# Patient Record
Sex: FEMALE | Race: BLACK OR AFRICAN AMERICAN | Employment: FULL TIME | URBAN - METROPOLITAN AREA
[De-identification: names, ages, dates, MRNs, and addresses within clinical notes are randomized per-mention and may not be internally consistent; named-entity substitution may affect disease eponyms.]

---

## 2018-08-10 ENCOUNTER — HOSPITAL ENCOUNTER (EMERGENCY)
Age: 23
Discharge: HOME | End: 2018-08-10
Attending: EMERGENCY MEDICINE
Payer: COMMERCIAL

## 2018-08-10 ENCOUNTER — APPOINTMENT (OUTPATIENT)
Dept: GENERAL RADIOLOGY | Age: 23
End: 2018-08-10
Payer: COMMERCIAL

## 2018-08-10 VITALS
OXYGEN SATURATION: 100 % | HEIGHT: 62 IN | HEART RATE: 92 BPM | DIASTOLIC BLOOD PRESSURE: 71 MMHG | WEIGHT: 120 LBS | RESPIRATION RATE: 16 BRPM | BODY MASS INDEX: 22.08 KG/M2 | TEMPERATURE: 99 F | SYSTOLIC BLOOD PRESSURE: 104 MMHG

## 2018-08-10 DIAGNOSIS — R06.02 SHORTNESS OF BREATH: Primary | ICD-10-CM

## 2018-08-10 LAB
A/G RATIO: 1.4 (ref 1.1–2.2)
ALBUMIN SERPL-MCNC: 4.3 G/DL (ref 3.4–5)
ALP BLD-CCNC: 54 U/L (ref 40–129)
ALT SERPL-CCNC: 9 U/L (ref 10–40)
ANION GAP SERPL CALCULATED.3IONS-SCNC: 11 MMOL/L (ref 3–16)
AST SERPL-CCNC: 15 U/L (ref 15–37)
BASOPHILS ABSOLUTE: 0 K/UL (ref 0–0.2)
BASOPHILS RELATIVE PERCENT: 0.4 %
BILIRUB SERPL-MCNC: 0.3 MG/DL (ref 0–1)
BILIRUBIN URINE: NEGATIVE
BLOOD, URINE: NEGATIVE
BUN BLDV-MCNC: 6 MG/DL (ref 7–20)
CALCIUM SERPL-MCNC: 9.2 MG/DL (ref 8.3–10.6)
CHLORIDE BLD-SCNC: 108 MMOL/L (ref 99–110)
CLARITY: CLEAR
CO2: 26 MMOL/L (ref 21–32)
COLOR: YELLOW
CREAT SERPL-MCNC: <0.5 MG/DL (ref 0.6–1.1)
D DIMER: <200 NG/ML DDU (ref 0–229)
EOSINOPHILS ABSOLUTE: 0.1 K/UL (ref 0–0.6)
EOSINOPHILS RELATIVE PERCENT: 1.9 %
GFR AFRICAN AMERICAN: >60
GFR NON-AFRICAN AMERICAN: >60
GLOBULIN: 3.1 G/DL
GLUCOSE BLD-MCNC: 90 MG/DL (ref 70–99)
GLUCOSE URINE: NEGATIVE MG/DL
HCG QUALITATIVE: NEGATIVE
HCT VFR BLD CALC: 41.5 % (ref 36–48)
HEMOGLOBIN: 14.2 G/DL (ref 12–16)
KETONES, URINE: NEGATIVE MG/DL
LEUKOCYTE ESTERASE, URINE: NEGATIVE
LYMPHOCYTES ABSOLUTE: 1.8 K/UL (ref 1–5.1)
LYMPHOCYTES RELATIVE PERCENT: 26.7 %
MCH RBC QN AUTO: 29.6 PG (ref 26–34)
MCHC RBC AUTO-ENTMCNC: 34.2 G/DL (ref 31–36)
MCV RBC AUTO: 86.5 FL (ref 80–100)
MICROSCOPIC EXAMINATION: NORMAL
MONOCYTES ABSOLUTE: 0.5 K/UL (ref 0–1.3)
MONOCYTES RELATIVE PERCENT: 7.9 %
NEUTROPHILS ABSOLUTE: 4.3 K/UL (ref 1.7–7.7)
NEUTROPHILS RELATIVE PERCENT: 63.1 %
NITRITE, URINE: NEGATIVE
PDW BLD-RTO: 13 % (ref 12.4–15.4)
PH UA: 7
PLATELET # BLD: 236 K/UL (ref 135–450)
PMV BLD AUTO: 7.6 FL (ref 5–10.5)
POTASSIUM SERPL-SCNC: 3.7 MMOL/L (ref 3.5–5.1)
PROTEIN UA: NEGATIVE MG/DL
RBC # BLD: 4.79 M/UL (ref 4–5.2)
SODIUM BLD-SCNC: 145 MMOL/L (ref 136–145)
SPECIFIC GRAVITY UA: 1.01
TOTAL PROTEIN: 7.4 G/DL (ref 6.4–8.2)
TROPONIN: <0.01 NG/ML
URINE REFLEX TO CULTURE: NORMAL
URINE TYPE: NORMAL
UROBILINOGEN, URINE: 0.2 E.U./DL
WBC # BLD: 6.8 K/UL (ref 4–11)

## 2018-08-10 PROCEDURE — 80053 COMPREHEN METABOLIC PANEL: CPT

## 2018-08-10 PROCEDURE — 84484 ASSAY OF TROPONIN QUANT: CPT

## 2018-08-10 PROCEDURE — 93005 ELECTROCARDIOGRAM TRACING: CPT | Performed by: NURSE PRACTITIONER

## 2018-08-10 PROCEDURE — 84703 CHORIONIC GONADOTROPIN ASSAY: CPT

## 2018-08-10 PROCEDURE — 96374 THER/PROPH/DIAG INJ IV PUSH: CPT

## 2018-08-10 PROCEDURE — 93010 ELECTROCARDIOGRAM REPORT: CPT | Performed by: INTERNAL MEDICINE

## 2018-08-10 PROCEDURE — 85379 FIBRIN DEGRADATION QUANT: CPT

## 2018-08-10 PROCEDURE — 85025 COMPLETE CBC W/AUTO DIFF WBC: CPT

## 2018-08-10 PROCEDURE — 99285 EMERGENCY DEPT VISIT HI MDM: CPT

## 2018-08-10 PROCEDURE — 71046 X-RAY EXAM CHEST 2 VIEWS: CPT

## 2018-08-10 PROCEDURE — 6360000002 HC RX W HCPCS: Performed by: NURSE PRACTITIONER

## 2018-08-10 PROCEDURE — 81003 URINALYSIS AUTO W/O SCOPE: CPT

## 2018-08-10 RX ORDER — IPRATROPIUM BROMIDE AND ALBUTEROL SULFATE 2.5; .5 MG/3ML; MG/3ML
1 SOLUTION RESPIRATORY (INHALATION) ONCE
Status: DISCONTINUED | OUTPATIENT
Start: 2018-08-10 | End: 2018-08-10 | Stop reason: HOSPADM

## 2018-08-10 RX ORDER — KETOROLAC TROMETHAMINE 30 MG/ML
30 INJECTION, SOLUTION INTRAMUSCULAR; INTRAVENOUS ONCE
Status: COMPLETED | OUTPATIENT
Start: 2018-08-10 | End: 2018-08-10

## 2018-08-10 RX ORDER — ALBUTEROL SULFATE 90 UG/1
2 AEROSOL, METERED RESPIRATORY (INHALATION) EVERY 4 HOURS PRN
Qty: 1 INHALER | Refills: 0 | Status: SHIPPED | OUTPATIENT
Start: 2018-08-10

## 2018-08-10 RX ORDER — PREDNISONE 10 MG/1
60 TABLET ORAL DAILY
Qty: 30 TABLET | Refills: 0 | Status: SHIPPED | OUTPATIENT
Start: 2018-08-10 | End: 2018-08-15

## 2018-08-10 RX ADMIN — KETOROLAC TROMETHAMINE 30 MG: 30 INJECTION, SOLUTION INTRAMUSCULAR at 12:25

## 2018-08-10 ASSESSMENT — PAIN SCALES - GENERAL
PAINLEVEL_OUTOF10: 2
PAINLEVEL_OUTOF10: 2

## 2018-08-10 ASSESSMENT — ENCOUNTER SYMPTOMS
VOMITING: 0
NAUSEA: 0
COUGH: 0
COLOR CHANGE: 0
SHORTNESS OF BREATH: 1
BACK PAIN: 0
ABDOMINAL PAIN: 0
DIARRHEA: 0

## 2018-08-10 ASSESSMENT — PAIN DESCRIPTION - LOCATION: LOCATION: CHEST

## 2018-08-10 ASSESSMENT — PAIN DESCRIPTION - DESCRIPTORS: DESCRIPTORS: SORE

## 2018-08-10 NOTE — ED PROVIDER NOTES
This chart was generated in part by using Dragon Dictation system and may contain errors related to that system including errors in grammar, punctuation, and spelling, as well as words and phrases that may be inappropriate. If there are any questions or concerns please feel free to contact the dictating provider for clarification.            Cristina Urena, DO  08/10/18 3 [facility], DO  08/11/18 6566

## 2018-08-10 NOTE — ED PROVIDER NOTES
1225)   Patient presents emergency room with episodes of shortness of breath, states that she's been having these episodes intermittently for the past couple of weeks. She states that the episodes, onset of May, she denies any triggers. She states that she just like she cannot take a deep breath or catch her breath. However she denies any chest pain or pleuritic chest pain cough or congestion. After evaluation and examination the patient nursing staff and I initiated orders. CBC shows no sepsis or anemia. Medical panel shows no electrolytes disturbances or renal insufficiency. Liver functions are normal.  Troponin is negative. D-dimer is negative. EKG shows no acute ST elevation or depression, please see the attending physician documentation for EKG interpretation. Pregnancy is negative. Urinalysis no acute infection. Chest x-ray shows no acute cardio pulmonary findings with the exception of suspected bronchial wall thickening. I do believe this is probably related to some underlying reactive airway. I did offer the patient a DuoNeb nebulizer however she declined stating she feels much better. Therefore at this time a little concerns for pulmonary emboli, pneumothorax, pneumonia, respiratory distress, hypoxia or acute coronary syndrome. Therefore, shared medical decision was made between the attending physician, the patient myself and we agreed patient be discharged home with outpatient follow-up. She was discharged home with prescription for albuterol inhaler and prednisone. Educated to take medicine as prescribed. Educated follow-up with the PCP next 2-3 days reevaluation but to return for worsening symptoms. The patient tolerated their visit well. I saw the patient independently with physician available for consultation as needed. The patient and / or the family were informed of the results of any tests, a time was given to answer questions, a plan was proposed and they agreed with plan. Patient verbalized understanding of discharge injections and was discharged from the department stable condition. CLINICAL IMPRESSION:  1.  Shortness of breath        DISPOSITION Decision To Discharge 08/10/2018 03:57:01 PM      PATIENT REFERRED TO:  Gina Bartholomew MD  [address] [place] [address]  [phone]    Schedule an appointment as soon as possible for a visit in 2 days  Please follow-up with family doctor in the next 2 days for reevaluation    [facility] [address]  [phone]  Go to   If symptoms worsen      DISCHARGE MEDICATIONS:  Discharge Medication List as of 8/10/2018  4:02 PM      START taking these medications    Details   albuterol sulfate HFA (PROVENTIL HFA) 108 (90 Base) MCG/ACT inhaler Inhale 2 puffs into the lungs every 4 hours as needed for Wheezing or Shortness of Breath (Space out to every 6 hours as symptoms improve) Space out to every 6 hours as symptoms improve., Disp-1 Inhaler, R-0Print      predniSONE (DELTASONE) 10 MG tablet Take 6 tablets by mouth daily for 5 doses, Disp-30 tablet, R-0Print             DISCONTINUED MEDICATIONS:  Discharge Medication List as of 8/10/2018  4:02 PM                 (Please note the MDM and HPI sections of this note were completed with a voice recognition program.  Efforts were made to edit the dictations but occasionally words are mis-transcribed.)    Electronically signed, PAT Michaud CNP,         PAT Michaud CNP  08/10/18 4173

## 2018-08-14 LAB
EKG ATRIAL RATE: 80 BPM
EKG DIAGNOSIS: NORMAL
EKG P AXIS: 70 DEGREES
EKG P-R INTERVAL: 162 MS
EKG Q-T INTERVAL: 356 MS
EKG QRS DURATION: 80 MS
EKG QTC CALCULATION (BAZETT): 410 MS
EKG R AXIS: 59 DEGREES
EKG T AXIS: 31 DEGREES
EKG VENTRICULAR RATE: 80 BPM

## 2018-09-06 ENCOUNTER — APPOINTMENT (OUTPATIENT)
Dept: GENERAL RADIOLOGY | Age: 23
End: 2018-09-06
Payer: COMMERCIAL

## 2018-09-06 ENCOUNTER — HOSPITAL ENCOUNTER (EMERGENCY)
Age: 23
Discharge: HOME | End: 2018-09-06
Attending: EMERGENCY MEDICINE
Payer: COMMERCIAL

## 2018-09-06 VITALS
BODY MASS INDEX: 24.69 KG/M2 | RESPIRATION RATE: 16 BRPM | WEIGHT: 135 LBS | SYSTOLIC BLOOD PRESSURE: 110 MMHG | HEART RATE: 85 BPM | DIASTOLIC BLOOD PRESSURE: 76 MMHG | TEMPERATURE: 97.9 F | OXYGEN SATURATION: 100 %

## 2018-09-06 DIAGNOSIS — R06.2 WHEEZING: ICD-10-CM

## 2018-09-06 DIAGNOSIS — R06.1 STRIDOR: Primary | ICD-10-CM

## 2018-09-06 LAB
BILIRUBIN URINE: NEGATIVE
BLOOD, URINE: NEGATIVE
CLARITY: CLEAR
COLOR: YELLOW
GLUCOSE URINE: NEGATIVE MG/DL
HCG(URINE) PREGNANCY TEST: NEGATIVE
KETONES, URINE: NEGATIVE MG/DL
LEUKOCYTE ESTERASE, URINE: NEGATIVE
MICROSCOPIC EXAMINATION: NORMAL
NITRITE, URINE: NEGATIVE
PH UA: 7.5
PROTEIN UA: NEGATIVE MG/DL
SPECIFIC GRAVITY UA: 1.01
URINE TYPE: NORMAL
UROBILINOGEN, URINE: 0.2 E.U./DL

## 2018-09-06 PROCEDURE — 70360 X-RAY EXAM OF NECK: CPT

## 2018-09-06 PROCEDURE — 81003 URINALYSIS AUTO W/O SCOPE: CPT

## 2018-09-06 PROCEDURE — 84703 CHORIONIC GONADOTROPIN ASSAY: CPT

## 2018-09-06 PROCEDURE — 71046 X-RAY EXAM CHEST 2 VIEWS: CPT

## 2018-09-06 PROCEDURE — 99285 EMERGENCY DEPT VISIT HI MDM: CPT

## 2018-09-06 RX ORDER — PANTOPRAZOLE SODIUM 20 MG/1
20 TABLET, DELAYED RELEASE ORAL DAILY
Qty: 30 TABLET | Refills: 0 | Status: SHIPPED | OUTPATIENT
Start: 2018-09-06

## 2018-09-06 RX ORDER — PREDNISONE 10 MG/1
60 TABLET ORAL DAILY
Qty: 24 TABLET | Refills: 0 | Status: SHIPPED | OUTPATIENT
Start: 2018-09-06 | End: 2018-09-10

## 2018-10-24 ENCOUNTER — APPOINTMENT (OUTPATIENT)
Dept: GENERAL RADIOLOGY | Age: 23
End: 2018-10-24
Payer: COMMERCIAL

## 2018-10-24 ENCOUNTER — HOSPITAL ENCOUNTER (EMERGENCY)
Age: 23
Discharge: HOME | End: 2018-10-24
Payer: COMMERCIAL

## 2018-10-24 VITALS
TEMPERATURE: 98.5 F | HEIGHT: 62 IN | HEART RATE: 108 BPM | BODY MASS INDEX: 25.76 KG/M2 | RESPIRATION RATE: 16 BRPM | OXYGEN SATURATION: 98 % | SYSTOLIC BLOOD PRESSURE: 120 MMHG | WEIGHT: 140 LBS | DIASTOLIC BLOOD PRESSURE: 72 MMHG

## 2018-10-24 DIAGNOSIS — J38.3 VOCAL CORD DYSFUNCTION: ICD-10-CM

## 2018-10-24 DIAGNOSIS — J45.901 EXACERBATION OF ASTHMA, UNSPECIFIED ASTHMA SEVERITY, UNSPECIFIED WHETHER PERSISTENT: Primary | ICD-10-CM

## 2018-10-24 PROCEDURE — 99285 EMERGENCY DEPT VISIT HI MDM: CPT

## 2018-10-24 PROCEDURE — 71046 X-RAY EXAM CHEST 2 VIEWS: CPT

## 2018-10-24 PROCEDURE — 6360000002 HC RX W HCPCS: Performed by: PHYSICIAN ASSISTANT

## 2018-10-24 RX ORDER — ALBUTEROL SULFATE 90 UG/1
2 AEROSOL, METERED RESPIRATORY (INHALATION) EVERY 6 HOURS PRN
Qty: 1 INHALER | Refills: 1 | Status: SHIPPED | OUTPATIENT
Start: 2018-10-24

## 2018-10-24 RX ADMIN — DEXAMETHASONE INTENSOL 10 MG: 1 SOLUTION, CONCENTRATE ORAL at 20:05

## 2018-10-24 ASSESSMENT — ENCOUNTER SYMPTOMS
EYES NEGATIVE: 1
TROUBLE SWALLOWING: 0
WHEEZING: 1
SHORTNESS OF BREATH: 1
COLOR CHANGE: 0
STRIDOR: 1
VOICE CHANGE: 1
ABDOMINAL PAIN: 0
COUGH: 0
VOMITING: 0

## 2018-10-24 NOTE — ED NOTES
Pt on monitor. Room air sats 100%. Pt says with asthma attacks, gets \"vocal cord dysfunction\". Respirations are noisy. No retractions or difficulty breathing noted.      Ashlie Hooker RN  10/24/18 2077

## 2018-10-24 NOTE — ED NOTES
Pt continues to await medication from pharmacy. No respiratory distress noted.  Continues to be monitored     Ashlie Hooker RN  10/24/18 1949

## 2018-10-24 NOTE — ED PROVIDER NOTES
[address] [facility]  ED  eMERGENCY dEPARTMENT eNCOUnter        Pt Name: Beverly Cowan  MRN: [id number]  [place] 1995  Date of evaluation: 10/24/2018  Provider: Verónica Santiago PA-C  PCP: Erin Frost DO  EDAttending: Brian Swain MD    History provided by the patient    CHIEF COMPLAINT:     Chief Complaint   Patient presents with    Shortness of Breath     worsening today. EMS gave her a duoneb with slight relief       HISTORY OF PRESENT ILLNESS:      Beverly Cowan is a 20 y.o. female who arrives to the emergency department via [facility]. This patient has a diagnosis of both asthma as well as vocal cord dysfunction. She has been evaluated in the in the ED a couple times in the last several months for asthma exacerbations as well as vocal cords spasms that seem to happen after an asthma exacerbation. She currently works as a \"temp\" at [employer]. For this reason, she doesn't currently have insurance, but anticipates getting it in December. Its been difficult for her to establish outpatient care secondary to her lack of insurance and resources. EMS were called secondary to the patient having an acute asthma exacerbation at work today. She used her albuterol inhaler multiple times but did not get relief until EMS gave her a nebulizer treatment in route. Now, she is no longer experiencing any chest pain, shortness of breath or wheezing. However, she has vocal cords spasms and stridor which are not atypical for her and which do seem to come after asthma exacerbations. The patient denies recent fevers, recent acute upper respiratory symptoms or feeling ill in any way. She is a nonsmoker. Nursing Notes were reviewed     REVIEW OF SYSTEMS:     Review of Systems   Constitutional: Negative for appetite change, chills and fever. HENT: Positive for voice change. Negative for congestion and trouble swallowing. Eyes: Negative.     Respiratory: Positive for PHYSICAL EXAM:       ED Triage Vitals [10/24/18 1754]   BP Temp Temp Source Pulse Resp SpO2 Height Weight   (!) 138/102 98.5 °F (36.9 °C) Oral 107 20 100 % 5' 2\" (1.575 m) 140 lb (63.5 kg)       Physical Exam    CONSTITUTIONAL: Awake and alert. Cooperative. Well-developed. Well-nourished. Non-toxic. No acute distress. HENT: Normocephalic. Atraumatic. External ears normal, without discharge. No nasal discharge. Oropharynx clear. Mucous membranes moist.  EYES: Conjunctiva non-injected. No scleral icterus. PERRL. EOM's grossly intact. NECK: Supple. Normal ROM. CARDIOVASCULAR: RRR. No Murmer. Intact distal pulses. PULMONARY/CHEST WALL:Effort normal. No tachypnea. Stridor appreciated. No respiratory distress. Lungs clear to ausculation. No wheezing, rales or rhonchi. ABDOMEN: Normal BS. Soft. Nondistended. No tenderness to palpate. No guarding. /ANORECTAL: Not assessed  MUSKULOSKELETAL: Normal ROM. No acute deformities. No edema. No tenderness to palpate. SKIN: Warm and dry. NEUROLOGICAL: Alert and oriented x 3. GCS 15. CN II-XII grossly intact. Strength is 5/5 in all extremities and sensation is intact. Normal gait. PSYCHIATRIC: Normal affect        DIAGNOSTICRESULTS:         RADIOLOGY:  All x-ray studies areviewed/reviewed by me. Formal interpretations per the radiologist are as follows:    Xr Chest Standard (2 Vw)    Result Date: 10/24/2018  EXAMINATION: TWO VIEWS OF THE CHEST 10/24/2018 3:07 pm COMPARISON: 09/06/2018 HISTORY: ORDERING SYSTEM PROVIDED HISTORY: sob, asthma exacerbat TECHNOLOGIST PROVIDED HISTORY: Reason for exam:->sob, asthma exacerbat Ordering Physician Provided Reason for Exam: sob, asthma exacerbat Acuity: Acute Type of Exam: Initial FINDINGS: No focal infiltrate is identified. No pneumothorax. Heart and mediastinal contours are unremarkable in appearance. No acute bony abnormalities are seen. No acute abnormality detected.          PROCEDURES:   N/A    CRITICAL CARE EPIGLOTTITIS, MENINGITIS, or AIRWAY COMPROMISE, PNEUMONIA, PNEUMOTHORAX, PULMONARY EMBOLISM, ACUTE CORONARY SYNDROME thus I consider the discharge disposition reasonable. [address] and I have discussed the diagnosis and risks, and we agree with discharging home to follow-up with their primary doctor. We also discussed returning to the Emergency Department immediately if new or worsening symptoms occur. We have discussed the symptoms which are most concerning (e.g., bloody sputum, fever, worsening pain or shortness of breath, vomiting) that necessitate immediate return. FINAL IMPRESSION:      1. Exacerbation of asthma, unspecified asthma severity, unspecified whether persistent    2. Vocal cord dysfunction          DISPOSITION/PLAN:   DISPOSITION Decision To Discharge      PATIENT REFERRED TO:  Christ Alfaro[address] MD Tommy  [address] [place] [address]  [phone]            DISCHARGE MEDICATIONS:  Discharge Medication List as of 10/24/2018  7:37 PM      START taking these medications    Details   !! albuterol sulfate HFA (VENTOLIN HFA) 108 (90 Base) MCG/ACT inhaler Inhale 2 puffs into the lungs every 6 hours as needed for Wheezing, Disp-1 Inhaler, R-1Print       !! - Potential duplicate medications found. Please discuss with provider.                      (Please note thatportions of this note were completed with a voice recognition program.  Efforts were made to edit the dictations, but occasionally words are mis-transcribed.)    Esa Horton PA-C (electronicallysigned)              Georgiana Padillama  10/24/18 2050

## 2018-10-25 ENCOUNTER — TELEPHONE (OUTPATIENT)
Dept: PULMONOLOGY | Age: 23
End: 2018-10-25

## 2018-12-20 ENCOUNTER — OFFICE VISIT (OUTPATIENT)
Dept: PULMONOLOGY | Age: 23
End: 2018-12-20
Payer: COMMERCIAL

## 2018-12-20 VITALS
BODY MASS INDEX: 26.68 KG/M2 | HEART RATE: 100 BPM | OXYGEN SATURATION: 97 % | SYSTOLIC BLOOD PRESSURE: 123 MMHG | WEIGHT: 145 LBS | DIASTOLIC BLOOD PRESSURE: 82 MMHG | RESPIRATION RATE: 18 BRPM | TEMPERATURE: 99.5 F | HEIGHT: 62 IN

## 2018-12-20 DIAGNOSIS — J45.20 MILD INTERMITTENT ASTHMA WITHOUT COMPLICATION: Primary | ICD-10-CM

## 2018-12-20 DIAGNOSIS — J38.3 VOCAL CORD DYSFUNCTION: ICD-10-CM

## 2018-12-20 PROCEDURE — 99244 OFF/OP CNSLTJ NEW/EST MOD 40: CPT | Performed by: INTERNAL MEDICINE

## 2018-12-20 RX ORDER — FERROUS SULFATE 325(65) MG
325 TABLET ORAL
COMMUNITY

## 2018-12-20 RX ORDER — FLUTICASONE FUROATE AND VILANTEROL 200; 25 UG/1; UG/1
1 POWDER RESPIRATORY (INHALATION) DAILY
Qty: 1 EACH | Refills: 1 | COMMUNITY
Start: 2018-12-20 | End: 2019-01-03

## 2018-12-20 ASSESSMENT — SLEEP AND FATIGUE QUESTIONNAIRES
HOW LIKELY ARE YOU TO NOD OFF OR FALL ASLEEP WHILE SITTING INACTIVE IN A PUBLIC PLACE: 0
HOW LIKELY ARE YOU TO NOD OFF OR FALL ASLEEP WHILE WATCHING TV: 2
HOW LIKELY ARE YOU TO NOD OFF OR FALL ASLEEP IN A CAR, WHILE STOPPED FOR A FEW MINUTES IN TRAFFIC: 0
NECK CIRCUMFERENCE (INCHES): 15
HOW LIKELY ARE YOU TO NOD OFF OR FALL ASLEEP WHILE SITTING AND READING: 1
HOW LIKELY ARE YOU TO NOD OFF OR FALL ASLEEP WHILE LYING DOWN TO REST IN THE AFTERNOON WHEN CIRCUMSTANCES PERMIT: 3
ESS TOTAL SCORE: 7
HOW LIKELY ARE YOU TO NOD OFF OR FALL ASLEEP WHILE SITTING QUIETLY AFTER LUNCH WITHOUT ALCOHOL: 0
HOW LIKELY ARE YOU TO NOD OFF OR FALL ASLEEP WHEN YOU ARE A PASSENGER IN A CAR FOR AN HOUR WITHOUT A BREAK: 1
HOW LIKELY ARE YOU TO NOD OFF OR FALL ASLEEP WHILE SITTING AND TALKING TO SOMEONE: 0

## 2018-12-20 NOTE — PROGRESS NOTES
Pulmonary Outpatient Note   Sofy Sahni MD       12/20/2018    1. Mild intermittent asthma without complication    2. Vocal cord dysfunction          ASSESSMENT/PLAN:  Mild intermittent asthma. The patient could have VCD alone, asthma with VCD. This was explained to the patient. I have recommended a methacholine challenge test to see whether she truly has asthma. I also gave her samples of Breo 200×2 for 14 days each. She was instructed on how to use it, told to rinse her mouth. Vocal cord dysfunction. She says she has not yet made her appointment with the speech therapist.  She recently got insurance when she joined [employer], was tearful that she had been sent home and is afraid she will lose her job and insurance. I encouraged her to make her appointment at the earliest.  She had been instructed on some exercises that apparently have not helped so far. Prophylaxis. If she truly has asthma, she should get flu shots annually, possibly Prevnar. RTC after testing. Orders Placed This Encounter   Procedures    Bronchial Challenge       No Follow-up on file. Chief Complaint:  New Patient and Asthma       HPI: Sammie Veliz is a 24y.o. year old female here for bronchial asthma, referred by the ER. Her PCP is Zack Perez DO. Nidia Pickett is a 54-year-old female who says she has a history of asthma. She was hiking in [place], has had difficulties since. She visited [place] and [place]. She is waking up in the middle of the night with asthma. She has VCD, diagnosed by her ENT surgeon Dr. Thai Jones. She is due to see a speech therapist, has not made an appointment as yet. The patient says her asthma was diagnosed as she had frequent ER visits for tightness, feeling someone is sitting on her chest, wheezing. Symptoms are worsened with exposure to dust, smoke and exercise. She feels she cannot breathe. She breathes better with 2 puffs of albuterol.   She has been to the ER once in August, route.  Now, she is no longer experiencing any chest pain, shortness of breath or wheezing. However, she has vocal cords spasms and stridor which are not atypical for her and which do seem to come after asthma exacerbations. The patient denies recent fevers, recent acute upper respiratory symptoms or feeling ill in any way. She is a nonsmoker.       Past Medical History:   Diagnosis Date    Asthma     Vocal cord dysfunction        No past surgical history on file. Social History   Substance Use Topics    Smoking status: Never Smoker    Smokeless tobacco: Never Used    Alcohol use No      Comment: Rare glass of wine       Family History   Problem Relation Age of Onset    Osteoporosis Mother     Hypertension Father          Current Outpatient Prescriptions:     ferrous sulfate 325 (65 Fe) MG tablet, Take 325 mg by mouth, Disp: , Rfl:     Fluticasone Furoate-Vilanterol (BREO ELLIPTA) 200-25 MCG/INH AEPB, Inhale 1 puff into the lungs daily for 14 days, Disp: 1 each, Rfl: 1    albuterol sulfate HFA (VENTOLIN HFA) 108 (90 Base) MCG/ACT inhaler, Inhale 2 puffs into the lungs every 6 hours as needed for Wheezing, Disp: 1 Inhaler, Rfl: 1    pantoprazole (PROTONIX) 20 MG tablet, Take 1 tablet by mouth daily, Disp: 30 tablet, Rfl: 0    albuterol sulfate HFA (PROVENTIL HFA) 108 (90 Base) MCG/ACT inhaler, Inhale 2 puffs into the lungs every 4 hours as needed for Wheezing or Shortness of Breath (Space out to every 6 hours as symptoms improve) Space out to every 6 hours as symptoms improve., Disp: 1 Inhaler, Rfl: 0    budesonide (PULMICORT FLEXHALER) 90 MCG/ACT AEPB inhaler, 1-2 puffs twice daily, Disp: , Rfl:     Gluten meal and Norgestimate-eth estradiol    Vitals:    12/20/18 1443   BP: 123/82   Pulse: 100   Resp: 18   Temp: 99.5 °F (37.5 °C)   SpO2: 97%   Weight: 145 lb (65.8 kg)   Height: 5' 2\" (1.575 m)       Review of Systems   Respiratory: Positive for chest tightness, shortness of breath and wheezing. All other systems reviewed and are negative. Physical Exam   Constitutional: She appears well-developed and well-nourished. No distress. Pleasant, averagely built   HENT:   Head: Normocephalic and atraumatic. Nose: Nose normal.   Mouth/Throat: Oropharynx is clear and moist. No oropharyngeal exudate. Making loud inspiratory stridorous sounds   Eyes: Pupils are equal, round, and reactive to light. Conjunctivae and EOM are normal. Right eye exhibits no discharge. Left eye exhibits no discharge. Neck: Normal range of motion. Neck supple. No JVD present. No tracheal deviation present. No thyromegaly present. Cardiovascular: Normal rate, regular rhythm and normal heart sounds. Exam reveals no gallop and no friction rub. No murmur heard. Pulmonary/Chest: Effort normal and breath sounds normal. No stridor. No respiratory distress. She has no wheezes. She has no rales. Abdominal: Soft. There is no tenderness. Musculoskeletal: She exhibits no edema. Lymphadenopathy:     She has no cervical adenopathy. Skin: Skin is warm and dry. No rash noted. She is not diaphoretic. No erythema. No pallor. Psychiatric: She has a normal mood and affect. Her behavior is normal.   Vitals reviewed.

## 2018-12-26 ENCOUNTER — HOSPITAL ENCOUNTER (OUTPATIENT)
Dept: PULMONOLOGY | Age: 23
Discharge: HOME | End: 2018-12-26
Payer: COMMERCIAL

## 2018-12-26 DIAGNOSIS — J38.3 VOCAL CORD DYSFUNCTION: ICD-10-CM

## 2018-12-26 DIAGNOSIS — J45.20 MILD INTERMITTENT ASTHMA WITHOUT COMPLICATION: ICD-10-CM

## 2018-12-26 PROCEDURE — 94664 DEMO&/EVAL PT USE INHALER: CPT

## 2018-12-26 PROCEDURE — 94010 BREATHING CAPACITY TEST: CPT

## 2018-12-26 PROCEDURE — 6360000002 HC RX W HCPCS: Performed by: INTERNAL MEDICINE

## 2018-12-26 PROCEDURE — 94070 EVALUATION OF WHEEZING: CPT

## 2018-12-26 PROCEDURE — 94726 PLETHYSMOGRAPHY LUNG VOLUMES: CPT

## 2018-12-26 PROCEDURE — 94760 N-INVAS EAR/PLS OXIMETRY 1: CPT

## 2018-12-26 PROCEDURE — 94729 DIFFUSING CAPACITY: CPT

## 2018-12-26 RX ORDER — ALBUTEROL SULFATE 2.5 MG/3ML
2.5 SOLUTION RESPIRATORY (INHALATION) ONCE
Status: COMPLETED | OUTPATIENT
Start: 2018-12-26 | End: 2018-12-26

## 2018-12-26 RX ADMIN — METHACHOLINE CHLORIDE 25 MG: 100 POWDER, FOR SOLUTION RESPIRATORY (INHALATION) at 10:39

## 2018-12-26 RX ADMIN — METHACHOLINE CHLORIDE 0.25 MG: 100 POWDER, FOR SOLUTION RESPIRATORY (INHALATION) at 10:18

## 2018-12-26 RX ADMIN — METHACHOLINE CHLORIDE 10 MG: 100 POWDER, FOR SOLUTION RESPIRATORY (INHALATION) at 10:27

## 2018-12-26 RX ADMIN — METHACHOLINE CHLORIDE 2.5 MG: 100 POWDER, FOR SOLUTION RESPIRATORY (INHALATION) at 10:21

## 2018-12-26 RX ADMIN — ALBUTEROL SULFATE 2.5 MG: 2.5 SOLUTION RESPIRATORY (INHALATION) at 10:42

## 2018-12-28 ENCOUNTER — TELEPHONE (OUTPATIENT)
Dept: PULMONOLOGY | Age: 23
End: 2018-12-28

## 2018-12-28 ASSESSMENT — ENCOUNTER SYMPTOMS
SHORTNESS OF BREATH: 1
WHEEZING: 1
CHEST TIGHTNESS: 1

## 2018-12-28 NOTE — PROCEDURES
[facility]                               PULMONARY FUNCTION    PATIENT NAME: Wyline Canavan                     :        1995  MED REC NO:   [id number]                          ROOM:  ACCOUNT NO:   [de-identified]                           ADMIT DATE: 2018  PROVIDER:     Maldonado Samuels MD    DATE OF PROCEDURE:  2018    This is a 65-year-old female. TESTS PERFORMED:  1. Spirometry with flow volume loops obtained. 2.  Lung volumes by plethysmography. 3.  Diffusion capacity of carbon monoxide. 4.  Methacholine challenge bronchoprovocation testing. Test meets ATS criteria and the quality of flow volume loops is  sufficient for interpretation. Good patient effort. The FEV1 is 2.72 L or 88% predicted. The FEV1/FVC ratio is 80. Total  lung capacity is 91% predicted and diffusion is 97% predicted. INTERPRETATION:  1. No baseline obstructive impairment. 2.  Positive methacholine challenge study at a dose of 25 with post  study FEV1 improving to 116% of baseline confirming airway  hyperreactivity. 3.  Normal lung volumes. 4.  Normal diffusion capacity. 5.  Clinical correlation recommended, the patient would benefit from  bronchodilator therapy.         Naveen Hendrix MD    D: 2018 19:48:43       T: 2018 0:49:46     [id number]  Job#: [id number]     Doc#: [id number]    CC:

## 2019-01-14 ENCOUNTER — TELEPHONE (OUTPATIENT)
Dept: PULMONOLOGY | Age: 24
End: 2019-01-14

## 2020-07-01 NOTE — ED PROVIDER NOTES
I independently performed a history and physical on Mi and Sara. All diagnostic, treatment, and disposition decisions were made by myself in conjunction with the resident physician. For further details of Donal Jerez emergency department encounter, please see the resident physician's documentation. Patient reports a new history of asthma this summer. Before the summer she never had any asthma. She denies any significant allergies. She owns the dog but is out of the dog for very long time. She lives in same apartment she slipped in for 2 years now. Today she had an episode and the inhaler did not help so she came here. I'm told that she had some wheezing on arrival but during my exam the patient has only stridor. Lungs are clear to auscultation during expiratory phase. Patient is comfortable. Pharyngeal exam is exceptional and I can very nearly see the tip of the epiglottis without use of a tongue depressor. Negative chest x-ray and soft tissue neck x-ray immediately believe that this is not anatomic but rather functional, perhaps paradoxical vocal cord motion. For this reason I'm referring her to ENT. She does complain of occasional reflux and I'm providing PPI for that reason.      Emmy Mead MD  09/06/18 9683
Smokeless tobacco: Never Used    Alcohol use No    Drug use: No    Sexual activity: Yes     Partners: Male     Other Topics Concern    Not on file     Social History Narrative    No narrative on file       Nursing notes reviewed. ED Triage Vitals [09/06/18 1104]   Enc Vitals Group      /75      Pulse 82      Resp 16      Temp 97.9 °F (36.6 °C)      Temp Source Oral      SpO2 100 %      Weight 135 lb (61.2 kg)      Height       Head Circumference       Peak Flow       Pain Score       Pain Loc       Pain Edu? Excl. in [address]? GENERAL:  Awake, alert. Well developed, well nourished with no apparent distress. HENT:  Normocephalic, Atraumatic, moist mucous membranes. EYES:  Pupils equal round and reactive to light, Conjunctiva normal, extraocular movements normal.  NECK:  No meningeal signs, Supple. CHEST:  Regular rate and rhythm. LUNGS:  Inspiratory stridor and expiratory wheezes. ABDOMEN:  Soft, non-tender, no rebound, rigidity or guarding, non-distended, normal bowel sounds. No costovertebral angle tenderness to palpation. EXTREMITIES:  Normal range of motion, no edema, no tenderness, no deformity, distal pulses present. BACK:  No tenderness. SKIN: Warm, dry and intact. NEUROLOGIC: Normal mental status. Moving all extremities to command. MEDICAL DECISION MAKING     Patient breathing easier after duoneb treatment; however stridor was remaining. Repeat x-ray was ordered to include the soft tissue of the neck. Patient was also given nebulized lidocaine to help with possible paradoxical vocal cord dysfunction. Her oxygen saturation has been  during this ER visit. Patient given referral for close follow up by ENT with instructions to return if breathing worsens.       Clinical Impression: Paradoxical Vocal Cord Dysfunction       Rima Ariaser  Resident  09/06/18 210 [address]  Resident  09/13/18 1213
Initial Size Of Lesion: 0.4

## 2025-06-13 NOTE — LETTER
[facility]  [address] [place] [address] Curtis Mendiola  Phone: [phone]  Fax: [phone]    Darryl Pike DO        December 28, 2018       Patient: Rex Baker   MR Number: [id number]   YOB: 1995   Date of Visit: 12/20/2018       Dear Dr. Jeff Ward:    Rex Baker was seen in consultation, was referred to me form the ER for the evaluation of asthma and VCD. Below are the relevant portions of my assessment and plan of care. If you have questions, please do not hesitate to call me. I look forward to following Miachel Needles along with you.     Sincerely,        Ryan Bloch, MD    CC providers:  MD Elder Gutierrez[address]: [phone]
None needed